# Patient Record
Sex: FEMALE | Race: WHITE | NOT HISPANIC OR LATINO | URBAN - METROPOLITAN AREA
[De-identification: names, ages, dates, MRNs, and addresses within clinical notes are randomized per-mention and may not be internally consistent; named-entity substitution may affect disease eponyms.]

---

## 2023-11-27 ENCOUNTER — HOSPITAL ENCOUNTER (OUTPATIENT)
Dept: RADIOLOGY | Facility: HOSPITAL | Age: 68
Discharge: HOME | End: 2023-11-27
Payer: OTHER GOVERNMENT

## 2023-11-27 VITALS — BODY MASS INDEX: 19.16 KG/M2 | WEIGHT: 115 LBS | HEIGHT: 65 IN

## 2023-11-27 DIAGNOSIS — Z12.31 ENCOUNTER FOR SCREENING MAMMOGRAM FOR MALIGNANT NEOPLASM OF BREAST: ICD-10-CM

## 2023-11-27 PROCEDURE — 77067 SCR MAMMO BI INCL CAD: CPT

## 2024-03-23 LAB
ALANINE AMINOTRANSFERASE (SGPT) (U/L) IN SER/PLAS EXTERNAL: 17 U/L
ALBUMIN (G/DL) IN SER/PLAS EXTERNAL: 4.5 G/DL
ALKALINE PHOSPHATASE (U/L) IN SER/PLAS EXTERNAL: 55 U/L
ASPARTATE AMINOTRANSFERASE (SGOT) (U/L) IN SER/PLAS EXTERNAL: 20 U/L
BILIRUBIN TOTAL (MG/DL) IN SER/PLAS EXTERNAL: 0.8 MG/DL
CALCIDIOL (25 OH VITAMIN D3) (NG/ML) IN SER/PLAS EXTERNAL: 31 NG/ML
CALCIUM (MG/DL) IN SER/PLAS EXTERNAL: 9.5 MG/DL
CARBON DIOXIDE, TOTAL (MMOL/L) IN SER/PLAS EXTERNAL: 26 MMOL/L
CHLORIDE (MMOL/L) IN SER/PLAS EXTERNAL: 105 MMOL/L
CHOLESTEROL (MG/DL) IN SER/PLAS EXTERNAL: 286 MG/DL
CHOLESTEROL IN HDL (MG/DL) IN SER/PLAS EXTERNAL: 90 MG/DL
CHOLESTEROL IN LDL (MG/DL) IN SERUM OR PLASMA BY CALCULATION EXTERNAL: 180 MG/DL
CHOLESTEROL/HDL RATIO EXTERNAL: 3.2
CREATININE (MG/DL) IN SER/PLAS EXTERNAL: 0.77 MG/DL
GLOMERULAR FILTRATION RATE ML/MIN/1.73 SQ M.PREDICTED EXTERNAL: 85 ML/MIN/1.73M*2
GLUCOSE (MG/DL) IN SER/PLAS EXTERNAL: 101 MG/DL
NON HDL CHOLESTEROL EXTERNAL: 196 MG/DL
POTASSIUM (MMOL/L) IN SER/PLAS EXTERMA;: 4 MMOL/L
PROTEIN TOTAL EXTERNAL: 6.5 G/DL
SODIUM (MMOL/L) IN SER/PLAS EXTERNAL: 139 MMOL/L
THYROTROPIN (MIU/L) IN SER/PLAS BY DETECTION LIMIT <= 0.05 MIU/L EXTERNAL: 0.39 MIU/L
TRIGLYCERIDE (MG/DL) IN SER/PLAS EXTERNAL: 59 MG/DL
UREA NITROGEN (MG/DL) IN SER/PLAS EXTERNAL: 20 MG/DL

## 2024-03-26 DIAGNOSIS — Z76.0 MEDICATION REFILL: ICD-10-CM

## 2024-03-26 RX ORDER — LEVOTHYROXINE SODIUM 75 UG/1
75 TABLET ORAL
Qty: 90 TABLET | Refills: 0 | Status: SHIPPED
Start: 2024-03-26 | End: 2024-06-04 | Stop reason: SDUPTHER

## 2024-03-26 RX ORDER — ESTRADIOL 0.1 MG/G
2 CREAM VAGINAL DAILY
COMMUNITY
End: 2024-03-26 | Stop reason: SDUPTHER

## 2024-03-26 RX ORDER — LEVOTHYROXINE SODIUM 75 UG/1
75 TABLET ORAL
COMMUNITY
End: 2024-03-26 | Stop reason: SDUPTHER

## 2024-03-26 RX ORDER — ESTRADIOL 0.1 MG/G
2 CREAM VAGINAL DAILY
Qty: 42.5 G | Refills: 1 | Status: SHIPPED
Start: 2024-03-26 | End: 2024-06-04 | Stop reason: SDUPTHER

## 2024-03-26 NOTE — TELEPHONE ENCOUNTER
Pt left vm stating that she is waiting on blood test results that she got done at [facility] diagnostic on Saturday. Pt also stated that she wants her prescriptions to be sent to [facility] pharmacy in [place]. That is the pharmacy already on file for pt.

## 2024-04-11 ENCOUNTER — OFFICE VISIT (OUTPATIENT)
Dept: PRIMARY CARE | Facility: CLINIC | Age: 69
End: 2024-04-11

## 2024-04-11 VITALS
TEMPERATURE: 97.5 F | HEIGHT: 65 IN | WEIGHT: 119 LBS | OXYGEN SATURATION: 98 % | HEART RATE: 96 BPM | SYSTOLIC BLOOD PRESSURE: 110 MMHG | BODY MASS INDEX: 19.83 KG/M2 | DIASTOLIC BLOOD PRESSURE: 80 MMHG

## 2024-04-11 DIAGNOSIS — Z00.00 ANNUAL PHYSICAL EXAM: Primary | ICD-10-CM

## 2024-04-11 DIAGNOSIS — E03.9 HYPOTHYROIDISM, UNSPECIFIED TYPE: ICD-10-CM

## 2024-04-11 DIAGNOSIS — Z71.85 VACCINE COUNSELING: ICD-10-CM

## 2024-04-11 DIAGNOSIS — R41.3 MEMORY DEFICIT: ICD-10-CM

## 2024-04-11 DIAGNOSIS — E78.5 HYPERLIPIDEMIA LDL GOAL <130: ICD-10-CM

## 2024-04-11 DIAGNOSIS — Z28.21 VACCINATION DECLINED BY PATIENT: ICD-10-CM

## 2024-04-11 DIAGNOSIS — Z78.0 ASYMPTOMATIC MENOPAUSAL STATE: ICD-10-CM

## 2024-04-11 DIAGNOSIS — Z12.11 ENCOUNTER FOR SCREENING FOR MALIGNANT NEOPLASM OF COLON: ICD-10-CM

## 2024-04-11 DIAGNOSIS — Z12.31 ENCOUNTER FOR SCREENING MAMMOGRAM FOR MALIGNANT NEOPLASM OF BREAST: ICD-10-CM

## 2024-04-11 PROBLEM — N94.10 DYSPAREUNIA IN FEMALE: Status: ACTIVE | Noted: 2024-04-11

## 2024-04-11 PROCEDURE — 1160F RVW MEDS BY RX/DR IN RCRD: CPT | Performed by: STUDENT IN AN ORGANIZED HEALTH CARE EDUCATION/TRAINING PROGRAM

## 2024-04-11 PROCEDURE — 1159F MED LIST DOCD IN RCRD: CPT | Performed by: STUDENT IN AN ORGANIZED HEALTH CARE EDUCATION/TRAINING PROGRAM

## 2024-04-11 PROCEDURE — 1124F ACP DISCUSS-NO DSCNMKR DOCD: CPT | Performed by: STUDENT IN AN ORGANIZED HEALTH CARE EDUCATION/TRAINING PROGRAM

## 2024-04-11 PROCEDURE — 99213 OFFICE O/P EST LOW 20 MIN: CPT | Performed by: STUDENT IN AN ORGANIZED HEALTH CARE EDUCATION/TRAINING PROGRAM

## 2024-04-11 PROCEDURE — 99397 PER PM REEVAL EST PAT 65+ YR: CPT | Performed by: STUDENT IN AN ORGANIZED HEALTH CARE EDUCATION/TRAINING PROGRAM

## 2024-04-11 PROCEDURE — 1126F AMNT PAIN NOTED NONE PRSNT: CPT | Performed by: STUDENT IN AN ORGANIZED HEALTH CARE EDUCATION/TRAINING PROGRAM

## 2024-04-11 ASSESSMENT — ANXIETY QUESTIONNAIRES
IF YOU CHECKED OFF ANY PROBLEMS ON THIS QUESTIONNAIRE, HOW DIFFICULT HAVE THESE PROBLEMS MADE IT FOR YOU TO DO YOUR WORK, TAKE CARE OF THINGS AT HOME, OR GET ALONG WITH OTHER PEOPLE: NOT DIFFICULT AT ALL
4. TROUBLE RELAXING: NOT AT ALL
5. BEING SO RESTLESS THAT IT IS HARD TO SIT STILL: NOT AT ALL
GAD7 TOTAL SCORE: 3
6. BECOMING EASILY ANNOYED OR IRRITABLE: NOT AT ALL
1. FEELING NERVOUS, ANXIOUS, OR ON EDGE: SEVERAL DAYS
7. FEELING AFRAID AS IF SOMETHING AWFUL MIGHT HAPPEN: NOT AT ALL
3. WORRYING TOO MUCH ABOUT DIFFERENT THINGS: SEVERAL DAYS
2. NOT BEING ABLE TO STOP OR CONTROL WORRYING: SEVERAL DAYS

## 2024-04-11 ASSESSMENT — PATIENT HEALTH QUESTIONNAIRE - PHQ9
7. TROUBLE CONCENTRATING ON THINGS, SUCH AS READING THE NEWSPAPER OR WATCHING TELEVISION: NOT AT ALL
4. FEELING TIRED OR HAVING LITTLE ENERGY: SEVERAL DAYS
9. THOUGHTS THAT YOU WOULD BE BETTER OFF DEAD, OR OF HURTING YOURSELF: NOT AT ALL
8. MOVING OR SPEAKING SO SLOWLY THAT OTHER PEOPLE COULD HAVE NOTICED. OR THE OPPOSITE, BEING SO FIGETY OR RESTLESS THAT YOU HAVE BEEN MOVING AROUND A LOT MORE THAN USUAL: NOT AT ALL
3. TROUBLE FALLING OR STAYING ASLEEP OR SLEEPING TOO MUCH: MORE THAN HALF THE DAYS
1. LITTLE INTEREST OR PLEASURE IN DOING THINGS: NOT AT ALL
6. FEELING BAD ABOUT YOURSELF - OR THAT YOU ARE A FAILURE OR HAVE LET YOURSELF OR YOUR FAMILY DOWN: NOT AT ALL
SUM OF ALL RESPONSES TO PHQ9 QUESTIONS 1 AND 2: 0
SUM OF ALL RESPONSES TO PHQ QUESTIONS 1-9: 3
5. POOR APPETITE OR OVEREATING: NOT AT ALL
2. FEELING DOWN, DEPRESSED OR HOPELESS: NOT AT ALL
10. IF YOU CHECKED OFF ANY PROBLEMS, HOW DIFFICULT HAVE THESE PROBLEMS MADE IT FOR YOU TO DO YOUR WORK, TAKE CARE OF THINGS AT HOME, OR GET ALONG WITH OTHER PEOPLE: NOT DIFFICULT AT ALL

## 2024-04-11 ASSESSMENT — PAIN SCALES - GENERAL: PAINLEVEL: 0-NO PAIN

## 2024-04-11 NOTE — PATIENT INSTRUCTIONS
Thank you for coming to see me today.    Please sign a release of information form so we can obtain your recent lab results from [facility].    Cologuard order entered today for colon cancer screening, they will call you in the coming days to verify your home address and then mail the kit to your home.    Bone density (DEXA) scan ordered today, call to schedule.    Future mammogram order entered for completion on 11/3/2024 or after, can call to schedule at your convenience.    Neurology referral entered for memory concerns, call to schedule.    Follow up with me in 6 months for routine medication review/thyroid labs, yearly for physical/wellness exam.
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
## 2024-04-15 ENCOUNTER — TELEPHONE (OUTPATIENT)
Dept: PRIMARY CARE | Facility: CLINIC | Age: 69
End: 2024-04-15
Payer: OTHER GOVERNMENT

## 2024-04-15 DIAGNOSIS — R41.3 MEMORY DEFICIT: Primary | ICD-10-CM
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
## 2024-04-16 ENCOUNTER — PATIENT OUTREACH (OUTPATIENT)
Dept: CARE COORDINATION | Facility: CLINIC | Age: 69
End: 2024-04-16
Payer: OTHER GOVERNMENT

## 2024-04-17 ENCOUNTER — DOCUMENTATION (OUTPATIENT)
Dept: CARE COORDINATION | Facility: CLINIC | Age: 69
End: 2024-04-17
Payer: OTHER GOVERNMENT

## 2024-04-17 SDOH — ECONOMIC STABILITY: GENERAL: WOULD YOU LIKE HELP WITH ANY OF THE FOLLOWING NEEDS?: HEALTHCARE COVERAGE;OTHER

## 2024-04-17 SDOH — ECONOMIC STABILITY: FOOD INSECURITY
ARE ANY OF YOUR NEEDS URGENT? FOR EXAMPLE, UNCERTAINTY OF WHERE YOU WILL GET YOUR NEXT MEAL OR NOT HAVING THE MEDICATIONS YOU NEED TO TAKE TOMORROW.: NO
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
## 2024-04-23 ENCOUNTER — TELEPHONE (OUTPATIENT)
Dept: PRIMARY CARE | Facility: CLINIC | Age: 69
End: 2024-04-23

## 2024-04-23 DIAGNOSIS — Z00.00 ROUTINE GENERAL MEDICAL EXAMINATION AT A HEALTH CARE FACILITY: ICD-10-CM

## 2024-04-23 NOTE — TELEPHONE ENCOUNTER
Labs that were ordered at Wellness Exam 05/01/2023 were completed 03/23/2024 - orders moved into Epic so results can be scanned

## 2024-05-01 ENCOUNTER — PATIENT OUTREACH (OUTPATIENT)
Dept: CARE COORDINATION | Facility: CLINIC | Age: 69
End: 2024-05-01
Payer: OTHER GOVERNMENT

## 2024-05-01 NOTE — PROGRESS NOTES
Outreach attempted, Answering machine at home #, attempted to phone Stacy on her mobile phone, Vm not set up yet.  We will try again at a later time.

## 2024-05-28 ENCOUNTER — DOCUMENTATION (OUTPATIENT)
Dept: CARE COORDINATION | Facility: CLINIC | Age: 69
End: 2024-05-28
Payer: OTHER GOVERNMENT

## 2024-05-28 NOTE — PROGRESS NOTES
Follow up outreaches have been unsuccessful at this time.   left with our # [phone] without success.  We will close Stacy at this time.  Please feel free to make a new referral in the future.  CASE CLOSED

## 2024-06-03 DIAGNOSIS — Z76.0 MEDICATION REFILL: ICD-10-CM

## 2024-06-04 RX ORDER — LEVOTHYROXINE SODIUM 75 UG/1
75 TABLET ORAL
Qty: 90 TABLET | Refills: 0 | Status: SHIPPED | OUTPATIENT
Start: 2024-06-04

## 2024-06-04 RX ORDER — ESTRADIOL 0.1 MG/G
2 CREAM VAGINAL DAILY
Qty: 42.5 G | Refills: 1 | Status: SHIPPED | OUTPATIENT
Start: 2024-06-04 | End: 2024-06-06 | Stop reason: SDUPTHER

## 2024-06-05 ENCOUNTER — E-VISIT (OUTPATIENT)
Dept: PRIMARY CARE | Facility: CLINIC | Age: 69
End: 2024-06-05
Payer: OTHER GOVERNMENT

## 2024-06-05 DIAGNOSIS — Z76.0 MEDICATION REFILL: ICD-10-CM

## 2024-06-05 NOTE — PATIENT COMMUNICATION
That was likely the quantity that was pended, I apologize.  We can send 3-month supply at a time until her next follow-up.  Did she already picked up the prescriptions or which she like a new one sent over?

## 2024-06-06 RX ORDER — ESTRADIOL 0.1 MG/G
2 CREAM VAGINAL DAILY
Qty: 85 G | Refills: 1 | Status: SHIPPED | OUTPATIENT
Start: 2024-07-01 | End: 2024-08-30

## 2024-06-06 RX ORDER — ESTRADIOL 0.1 MG/G
2 CREAM VAGINAL DAILY
Qty: 85 G | Refills: 1 | Status: SHIPPED | OUTPATIENT
Start: 2024-06-06 | End: 2024-06-06

## 2024-09-18 DIAGNOSIS — E03.9 HYPOTHYROIDISM, UNSPECIFIED TYPE: ICD-10-CM

## 2024-09-18 DIAGNOSIS — Z76.0 MEDICATION REFILL: ICD-10-CM

## 2024-09-19 RX ORDER — LEVOTHYROXINE SODIUM 75 UG/1
TABLET ORAL
Qty: 90 TABLET | Refills: 0 | Status: SHIPPED | OUTPATIENT
Start: 2024-09-19

## 2024-11-27 ENCOUNTER — APPOINTMENT (OUTPATIENT)
Dept: RADIOLOGY | Facility: HOSPITAL | Age: 69
End: 2024-11-27

## 2024-12-11 DIAGNOSIS — E03.9 HYPOTHYROIDISM, UNSPECIFIED TYPE: ICD-10-CM

## 2024-12-11 RX ORDER — LEVOTHYROXINE SODIUM 75 UG/1
TABLET ORAL
Qty: 90 TABLET | Refills: 0 | Status: SHIPPED | OUTPATIENT
Start: 2024-12-11

## 2025-02-10 ENCOUNTER — OFFICE VISIT (OUTPATIENT)
Dept: PRIMARY CARE | Facility: CLINIC | Age: 70
End: 2025-02-10
Payer: OTHER GOVERNMENT

## 2025-02-10 VITALS
HEART RATE: 88 BPM | SYSTOLIC BLOOD PRESSURE: 130 MMHG | WEIGHT: 120.34 LBS | OXYGEN SATURATION: 99 % | TEMPERATURE: 98.2 F | DIASTOLIC BLOOD PRESSURE: 70 MMHG | BODY MASS INDEX: 20.03 KG/M2

## 2025-02-10 DIAGNOSIS — E03.9 HYPOTHYROIDISM, UNSPECIFIED TYPE: ICD-10-CM

## 2025-02-10 DIAGNOSIS — N94.10 DYSPAREUNIA IN FEMALE: ICD-10-CM

## 2025-02-10 PROCEDURE — 1159F MED LIST DOCD IN RCRD: CPT | Performed by: STUDENT IN AN ORGANIZED HEALTH CARE EDUCATION/TRAINING PROGRAM

## 2025-02-10 PROCEDURE — 1126F AMNT PAIN NOTED NONE PRSNT: CPT | Performed by: STUDENT IN AN ORGANIZED HEALTH CARE EDUCATION/TRAINING PROGRAM

## 2025-02-10 PROCEDURE — 99214 OFFICE O/P EST MOD 30 MIN: CPT | Performed by: STUDENT IN AN ORGANIZED HEALTH CARE EDUCATION/TRAINING PROGRAM

## 2025-02-10 RX ORDER — ESTRADIOL 0.1 MG/G
2 CREAM VAGINAL DAILY
Qty: 180 G | Refills: 1 | Status: SHIPPED | OUTPATIENT
Start: 2025-02-10

## 2025-02-10 RX ORDER — LEVOTHYROXINE SODIUM 75 UG/1
75 TABLET ORAL DAILY
Qty: 90 TABLET | Refills: 1 | Status: SHIPPED | OUTPATIENT
Start: 2025-02-10

## 2025-02-10 ASSESSMENT — COLUMBIA-SUICIDE SEVERITY RATING SCALE - C-SSRS
1. IN THE PAST MONTH, HAVE YOU WISHED YOU WERE DEAD OR WISHED YOU COULD GO TO SLEEP AND NOT WAKE UP?: NO
2. HAVE YOU ACTUALLY HAD ANY THOUGHTS OF KILLING YOURSELF?: NO
6. HAVE YOU EVER DONE ANYTHING, STARTED TO DO ANYTHING, OR PREPARED TO DO ANYTHING TO END YOUR LIFE?: NO

## 2025-02-10 ASSESSMENT — PATIENT HEALTH QUESTIONNAIRE - PHQ9
2. FEELING DOWN, DEPRESSED OR HOPELESS: NOT AT ALL
1. LITTLE INTEREST OR PLEASURE IN DOING THINGS: NOT AT ALL
SUM OF ALL RESPONSES TO PHQ9 QUESTIONS 1 AND 2: 0

## 2025-02-10 ASSESSMENT — PAIN SCALES - GENERAL: PAINLEVEL_OUTOF10: 0-NO PAIN

## 2025-02-10 NOTE — PATIENT INSTRUCTIONS
Thank you for see me today.    Medication refill sent to your pharmacy.    Please follow-up with [facility] regarding extra lab orders/billing concerns.  I do not see evidence of this in my chart review today, if my name is attached to the extra orders being charged, please have [facility] send a copy to our office so we can further assess this issue.    Follow-up in April for YEARLY PHYSICAL, sooner if needed.

## 2025-02-10 NOTE — PROGRESS NOTES
Subjective   Stacy Hawk is a 69 y.o. female who presents for Follow-up.    HPI:      This is a 69-year-old female presenting for follow-up and medication refills.  Last CPE April 2024.    Hypothyroidism:  Managed with levothyroxine 75 mcg daily.    Lab Results   Component Value Date    TSH 0.39 03/23/2024     Dyspareunia:  Managed with Estrace cream.  Uses daily for 3 weeks and then off for one week.    Billing issue  Got bill 3 months later for additional labs.    ROS:   Review of systems is essentially negative for all systems except for any identified issues in HPI above.    Objective     /70   Pulse 88   Temp 36.8 °C (98.2 °F)   Wt 54.6 kg (120 lb 5.4 oz)   SpO2 99%   BMI 20.03 kg/m²      PHYSICAL EXAM    GENERAL  Well-appearing, pleasant and cooperative.  No acute distress.    HEENT  HEAD:   Normocephalic.  Atraumatic.  EYES:  PERRLA.  No scleral icterus or conjunctival injection.  NECK:  No adenopathy.  No palpable thyroid enlargement or nodules.    THROAT:  Moist oropharynx without tonsillar enlargement or exudates.    LUNGS:    Clear to auscultation bilaterally.  No wheezes, rales, rhonchi.    CARDIAC:  Regular rate and rhythm.  Normal S1S2.  No murmurs/rubs/gallops.    ABDOMEN:  Soft, non-tender, non-distended.  No hepatosplenomegaly.  Normoactive bowel sounds.    MUSCULOSKELETAL:  No gross abnormalities.       EXTREMITIES:  No LE edema or cyanosis.      NEURO           Alert and oriented x3. No focal deficits.    PSYCH:          Affect appropriate.           Assessment/Plan   Problem List Items Addressed This Visit       Dyspareunia in female    Relevant Medications    estradiol (Estrace) 0.01 % (0.1 mg/gram) vaginal cream    Hypothyroidism    Relevant Medications    levothyroxine (Synthroid, Levoxyl) 75 mcg tablet     Counseling:   Medication education:    Education: The patient is counseled regarding potential side effects of all new medications.    Understanding:  Patient expressed  understanding.    Adherence:  No barriers to adherence identified.         Chiquis Noel MD

## 2025-08-28 DIAGNOSIS — E03.9 HYPOTHYROIDISM, UNSPECIFIED TYPE: ICD-10-CM

## 2025-08-29 RX ORDER — LEVOTHYROXINE SODIUM 75 UG/1
TABLET ORAL
Qty: 90 TABLET | Refills: 0 | Status: SHIPPED | OUTPATIENT
Start: 2025-08-29